# Patient Record
Sex: MALE | Race: BLACK OR AFRICAN AMERICAN | Employment: FULL TIME | URBAN - METROPOLITAN AREA
[De-identification: names, ages, dates, MRNs, and addresses within clinical notes are randomized per-mention and may not be internally consistent; named-entity substitution may affect disease eponyms.]

---

## 2020-03-25 ENCOUNTER — TELEPHONE (OUTPATIENT)
Dept: ADMINISTRATIVE | Age: 40
End: 2020-03-25

## 2020-03-25 ENCOUNTER — OFFICE VISIT (OUTPATIENT)
Dept: PRIMARY CARE CLINIC | Age: 40
End: 2020-03-25

## 2020-03-25 VITALS
HEART RATE: 78 BPM | SYSTOLIC BLOOD PRESSURE: 140 MMHG | TEMPERATURE: 98.4 F | OXYGEN SATURATION: 98 % | DIASTOLIC BLOOD PRESSURE: 90 MMHG

## 2020-03-25 PROCEDURE — 99213 OFFICE O/P EST LOW 20 MIN: CPT | Performed by: FAMILY MEDICINE

## 2020-03-25 RX ORDER — BENZONATATE 100 MG/1
100 CAPSULE ORAL 3 TIMES DAILY PRN
Qty: 30 CAPSULE | Refills: 0 | Status: SHIPPED | OUTPATIENT
Start: 2020-03-25 | End: 2020-04-01

## 2020-03-25 NOTE — LETTER
[address]  Phone: [phone]  Fax: [address], DO        March 25, 2020     Patient: Rashard Alexander   YOB: 1980   Date of Visit: 3/25/2020       To Whom It May Concern: It is my medical opinion that Rashard Alexander may return to work on 4/8/2020. If you have any questions or concerns, please don't hesitate to call.     Sincerely,        Ya Iglesias DO

## 2020-04-01 ENCOUNTER — TELEPHONE (OUTPATIENT)
Dept: PRIMARY CARE CLINIC | Age: 40
End: 2020-04-01

## 2020-04-02 NOTE — TELEPHONE ENCOUNTER
2nd call to follow up with pt re: flu clinic visit last week and check on symptoms. Advised pt to call back with update. Advised to follow up with flu clinic with any unresolved symptoms.

## 2020-09-30 NOTE — PROGRESS NOTES
Ju Coburn discharged to home accompanied by self.   Patient provided with the following educational materials upon discharge:  Medication management.   Valuables and belongings sent with patient.   discharge instructions, medications and follow up appointments reviewed with patient.  Patient verbalized understanding.   Neurological:      General: No focal deficit present. Mental Status: He is alert and oriented to person, place, and time. Psychiatric:         Mood and Affect: Mood normal.         Behavior: Behavior normal.            ASSESSMENT/PLAN:     Diagnosis Orders   1. Viral URI       Recommend to quarantine for 14 days due to co workers next to him being quarantined. tessalon pearles given for symptoms. Side effects of medication were reviewed with patient     Call or go to ED immediately if symptoms worsen or persist.  Follow up as needed or sooner if necessary. Counseled regarding above diagnosis, including possible risks and complications,  especially if left uncontrolled. Counseled regarding the possible side effects, risks, benefits and alternatives to treatment; patient and/or guardian verbalizes understanding. Advised patient to call with any new medication issues. All questions answered. Sandee Velázquez DO  3/25/20     This visit was provided as a focused evaluation during the COVID -19 pandemic/national emergency. A comprehensive review of all previous patient history and testing was not conducted. Pertinent findings were elicited during the visit.